# Patient Record
Sex: MALE | Race: BLACK OR AFRICAN AMERICAN
[De-identification: names, ages, dates, MRNs, and addresses within clinical notes are randomized per-mention and may not be internally consistent; named-entity substitution may affect disease eponyms.]

---
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
## 2018-02-09 ENCOUNTER — HOSPITAL ENCOUNTER (OUTPATIENT)
Dept: HOSPITAL 62 - END | Age: 18
Discharge: HOME | End: 2018-02-09
Attending: INTERNAL MEDICINE
Payer: OTHER GOVERNMENT

## 2018-02-09 VITALS — DIASTOLIC BLOOD PRESSURE: 74 MMHG | SYSTOLIC BLOOD PRESSURE: 122 MMHG

## 2018-02-09 DIAGNOSIS — K20.9: ICD-10-CM

## 2018-02-09 DIAGNOSIS — K31.84: Primary | ICD-10-CM

## 2018-02-09 PROCEDURE — 43236 UPPR GI SCOPE W/SUBMUC INJ: CPT

## 2018-02-09 PROCEDURE — 88305 TISSUE EXAM BY PATHOLOGIST: CPT

## 2018-02-09 PROCEDURE — 88342 IMHCHEM/IMCYTCHM 1ST ANTB: CPT

## 2018-02-09 PROCEDURE — 43239 EGD BIOPSY SINGLE/MULTIPLE: CPT

## 2018-02-09 PROCEDURE — 88312 SPECIAL STAINS GROUP 1: CPT

## 2018-02-09 PROCEDURE — 0DB58ZX EXCISION OF ESOPHAGUS, VIA NATURAL OR ARTIFICIAL OPENING ENDOSCOPIC, DIAGNOSTIC: ICD-10-PCS | Performed by: INTERNAL MEDICINE

## 2018-02-09 PROCEDURE — 3E0G8GC INTRODUCTION OF OTHER THERAPEUTIC SUBSTANCE INTO UPPER GI, VIA NATURAL OR ARTIFICIAL OPENING ENDOSCOPIC: ICD-10-PCS | Performed by: INTERNAL MEDICINE

## 2018-02-09 RX ADMIN — FENTANYL CITRATE ONE MCG: 50 INJECTION INTRAMUSCULAR; INTRAVENOUS at 10:42

## 2018-02-09 RX ADMIN — MIDAZOLAM HYDROCHLORIDE ONE MG: 1 INJECTION, SOLUTION INTRAMUSCULAR; INTRAVENOUS at 10:36

## 2018-02-09 RX ADMIN — FENTANYL CITRATE ONE MCG: 50 INJECTION INTRAMUSCULAR; INTRAVENOUS at 10:38

## 2018-02-09 RX ADMIN — MIDAZOLAM HYDROCHLORIDE ONE MG: 1 INJECTION, SOLUTION INTRAMUSCULAR; INTRAVENOUS at 10:40

## 2018-02-09 NOTE — OPERATIVE REPORT
Operative Report


DATE OF SURGERY: 02/09/18


Operative Report: 





The risks benefits and alternatives of the procedure explained to the patient 

in detail and informed consent is obtained.A GIF Olympus video scope was 

inserted into the patient's mouth and hypopharynx, the esophagus is identified 

intubated and insufflated, the scope was then advanced through the esophagus 

stomach and duodenum ,retroflexion maneuver is done ,the esophagus stomach and 

first and second portions of the duodenum examined


PREOPERATIVE DIAGNOSIS: Gastroparesis, cyclic vomiting syndrome, GERD


POSTOPERATIVE DIAGNOSIS: Erosive esophagitis status post biopsy.  Submucosal 

injection Botox gastric antrum


OPERATION: EGD with submucosal injection.  EGD with biopsy


SURGEON: ROSA BRANDT


ANESTHESIA: Moderate Sedation - 4 mg of Versed, 75 mcg of fentanyl.  Conscious 

sedation monitoring time 30 minutes.


TISSUE REMOVED OR ALTERED: Distal esophageal specimen obtained without Murillo'

s esophagus.


COMPLICATIONS: 





None.


ESTIMATED BLOOD LOSS: None.


INTRAOPERATIVE FINDINGS: As noted above.


PROCEDURE: 





Patient tolerated procedure well.


No immediate postprocedure complications are noted.


Patient discharged in good condition.


Discharge date 2/9/2018.


Discharge diet: Regular.


Discharge activity: Regular.


2-3 week follow-up to discuss findings.


Patient is instructed to call the office or proceed to the emergency room 

should there be any further problems or questions.


We will await pathology.


Continue proton pump inhibitor therapy.

## 2018-05-03 ENCOUNTER — HOSPITAL ENCOUNTER (OUTPATIENT)
Dept: HOSPITAL 62 - END | Age: 18
Discharge: HOME | End: 2018-05-03
Attending: INTERNAL MEDICINE
Payer: OTHER GOVERNMENT

## 2018-05-03 VITALS — DIASTOLIC BLOOD PRESSURE: 73 MMHG | SYSTOLIC BLOOD PRESSURE: 121 MMHG

## 2018-05-03 DIAGNOSIS — K21.0: ICD-10-CM

## 2018-05-03 DIAGNOSIS — K29.70: ICD-10-CM

## 2018-05-03 DIAGNOSIS — K31.84: Primary | ICD-10-CM

## 2018-05-03 DIAGNOSIS — K44.9: ICD-10-CM

## 2018-05-03 PROCEDURE — 88305 TISSUE EXAM BY PATHOLOGIST: CPT

## 2018-05-03 PROCEDURE — 43239 EGD BIOPSY SINGLE/MULTIPLE: CPT

## 2018-05-03 PROCEDURE — 43236 UPPR GI SCOPE W/SUBMUC INJ: CPT

## 2018-05-03 RX ADMIN — FENTANYL CITRATE ONE MCG: 50 INJECTION INTRAMUSCULAR; INTRAVENOUS at 13:52

## 2018-05-03 RX ADMIN — MIDAZOLAM HYDROCHLORIDE ONE MG: 1 INJECTION, SOLUTION INTRAMUSCULAR; INTRAVENOUS at 13:55

## 2018-05-03 RX ADMIN — FENTANYL CITRATE ONE MCG: 50 INJECTION INTRAMUSCULAR; INTRAVENOUS at 13:54

## 2018-05-03 RX ADMIN — MIDAZOLAM HYDROCHLORIDE ONE MG: 1 INJECTION, SOLUTION INTRAMUSCULAR; INTRAVENOUS at 13:51

## 2018-05-03 NOTE — OPERATIVE REPORT
Operative Report


DATE OF SURGERY: 05/03/18


Operative Report: 





The risks benefits and alternatives of the procedure explained to the patient 

in detail and informed consent is obtained.A GIF Olympus video scope was 

inserted into the patient's mouth and hypopharynx, the esophagus is identified 

intubated and insufflated, the scope was then advanced through the esophagus 

stomach and duodenum, retroflexion maneuver is done ,the esophagus stomach and 

first and second portions of the duodenum examined


PREOPERATIVE DIAGNOSIS: Nausea vomiting, gastroesophageal reflux disease


POSTOPERATIVE DIAGNOSIS: Esophageal rings and furrows status post biopsy rule 

out eosinophilic esophagitis.  Esophagitis.  Gastritis.  Hiatal hernia.  

Gastroparesis status post Botox injection


OPERATION: EGD with submucosal injection.  EGD with biopsy


SURGEON: ROSA BRANDT


ANESTHESIA: Moderate Sedation - 4 mg of Versed, 75 mcg of fentanyl.  Conscious 

sedation monitoring time 30 minutes.


TISSUE REMOVED OR ALTERED: Esophageal specimens obtained for reasons stated 

above


COMPLICATIONS: 





None.


ESTIMATED BLOOD LOSS: None.


INTRAOPERATIVE FINDINGS: As noted above.


PROCEDURE: 





Patient tolerated the procedure well.


No immediate postprocedure complications are noted.


Patient discharged in good condition.


Discharge date 5/3/2018.


Discharge diet: Regular.


Discharge activity: Regular.


2-3 week follow-up to discuss findings.


Patient is instructed call the office or proceed to the emergency room should 

there be any further problems or questions.


We will wait on pathology.

## 2018-08-23 ENCOUNTER — HOSPITAL ENCOUNTER (OUTPATIENT)
Dept: HOSPITAL 62 - END | Age: 18
Discharge: HOME | End: 2018-08-23
Attending: INTERNAL MEDICINE
Payer: OTHER GOVERNMENT

## 2018-08-23 VITALS — DIASTOLIC BLOOD PRESSURE: 76 MMHG | SYSTOLIC BLOOD PRESSURE: 124 MMHG

## 2018-08-23 DIAGNOSIS — Z79.899: ICD-10-CM

## 2018-08-23 DIAGNOSIS — G43.A0: Primary | ICD-10-CM

## 2018-08-23 DIAGNOSIS — K31.84: ICD-10-CM

## 2018-08-23 PROCEDURE — 43236 UPPR GI SCOPE W/SUBMUC INJ: CPT

## 2018-08-23 NOTE — OPERATIVE REPORT
Operative Report


DATE OF SURGERY: 08/23/18


Operative Report: 





The risks benefits and alternatives of the procedure explained to the patient 

in detail and informed consent is obtained.A GIF Olympus video scope was 

inserted into the patient's mouth and hypopharynx, the esophagus is identified 

intubated and insufflated, the scope was then advanced through the esophagus 

stomach and duodenum, retroflexion maneuver is done the esophagus stomach and 

first and second portions of the duodenum examined


PREOPERATIVE DIAGNOSIS: Gastroparesis, cyclic vomiting syndrome


POSTOPERATIVE DIAGNOSIS: Status post Botox injection of the gastric outlet 

total of 5 mL of 200 units


OPERATION: EGD with submucosal injection


SURGEON: ROSA BRANDT


ANESTHESIA: Moderate Sedation - 4 mg of Versed, 75 mcg of fentanyl.  Conscious 

sedation monitoring time 30 minutes.


TISSUE REMOVED OR ALTERED: None.


COMPLICATIONS: 





None.


ESTIMATED BLOOD LOSS: None.


INTRAOPERATIVE FINDINGS: As noted above.


PROCEDURE: 





Patient tolerated the procedure well.  No immediate postprocedure complications 

are noted.  Patient discharged in good condition.


Discharge date 8/23/2018.


Discharge diet: Regular.


Discharge activity: Regular.


2-3 week follow-up to discuss findings.


Patient is instructed call the office or proceed to the emergency room should 

there be any further problems or questions.


Patient to call if further injections are required.

## 2019-02-12 ENCOUNTER — HOSPITAL ENCOUNTER (OUTPATIENT)
Dept: HOSPITAL 62 - END | Age: 19
Discharge: HOME | End: 2019-02-12
Attending: INTERNAL MEDICINE
Payer: MEDICARE

## 2019-02-12 VITALS — DIASTOLIC BLOOD PRESSURE: 67 MMHG | SYSTOLIC BLOOD PRESSURE: 124 MMHG

## 2019-02-12 DIAGNOSIS — G40.309: ICD-10-CM

## 2019-02-12 DIAGNOSIS — K31.84: Primary | ICD-10-CM
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
## 2019-02-12 DIAGNOSIS — K44.9: ICD-10-CM

## 2019-02-12 DIAGNOSIS — E88.40: ICD-10-CM

## 2019-02-12 DIAGNOSIS — Z79.899: ICD-10-CM

## 2019-02-12 DIAGNOSIS — R11.2: ICD-10-CM

## 2019-02-12 DIAGNOSIS — T18.2XXA: ICD-10-CM

## 2019-02-12 PROCEDURE — 43236 UPPR GI SCOPE W/SUBMUC INJ: CPT

## 2019-02-12 RX ADMIN — MIDAZOLAM HYDROCHLORIDE ONE MG: 1 INJECTION, SOLUTION INTRAMUSCULAR; INTRAVENOUS at 12:00

## 2019-02-12 RX ADMIN — MIDAZOLAM HYDROCHLORIDE ONE MG: 1 INJECTION, SOLUTION INTRAMUSCULAR; INTRAVENOUS at 11:57

## 2019-07-09 ENCOUNTER — HOSPITAL ENCOUNTER (OUTPATIENT)
Dept: HOSPITAL 62 - END | Age: 19
Discharge: HOME | End: 2019-07-09
Attending: INTERNAL MEDICINE
Payer: MEDICARE

## 2019-07-09 VITALS — SYSTOLIC BLOOD PRESSURE: 111 MMHG | DIASTOLIC BLOOD PRESSURE: 30 MMHG

## 2019-07-09 DIAGNOSIS — K21.0: ICD-10-CM

## 2019-07-09 DIAGNOSIS — R11.2: ICD-10-CM

## 2019-07-09 DIAGNOSIS — Z79.899: ICD-10-CM

## 2019-07-09 DIAGNOSIS — K31.84: Primary | ICD-10-CM

## 2019-07-09 PROCEDURE — 43236 UPPR GI SCOPE W/SUBMUC INJ: CPT

## 2019-07-09 RX ADMIN — MIDAZOLAM HYDROCHLORIDE ONE MG: 1 INJECTION, SOLUTION INTRAMUSCULAR; INTRAVENOUS at 12:15

## 2019-07-09 RX ADMIN — MIDAZOLAM HYDROCHLORIDE ONE MG: 1 INJECTION, SOLUTION INTRAMUSCULAR; INTRAVENOUS at 12:12

## 2019-07-09 NOTE — OPERATIVE REPORT
Operative Report


DATE OF SURGERY: 07/09/19


Operative Report: 





The risks benefits and alternatives of the procedure explained to the patient in

detail and informed consent is obtained.A GIF Olympus video scope was inserted 

into the patient's mouth and hypopharynx, the esophagus is identified intubated 

and insufflated, the scope was then advanced through the esophagus stomach and 

duodenum ,retroflexion maneuver is done ,the esophagus stomach and first and 

second portions of the duodenum examined.


PREOPERATIVE DIAGNOSIS: Gastroparesis, nausea vomiting


POSTOPERATIVE DIAGNOSIS: Gastroparesis status post submucosal Botox injection


OPERATION: EGD with submucosal injection


SURGEON: ROSA BRANDT


ANESTHESIA: Moderate Sedation - 4 mg of Versed, 75 mcg of fentanyl.  Conscious 

sedation monitoring time 30 minutes.


TISSUE REMOVED OR ALTERED: None.


COMPLICATIONS: 





None.


ESTIMATED BLOOD LOSS: None.


INTRAOPERATIVE FINDINGS: As noted above.


PROCEDURE: 





Patient tolerated the procedure well.


No immediate postprocedure complications are noted.


Patient is discharged in good condition.


Discharge date 7/9/2019.


Discharge diet: Regular.


Discharge activity: Regular.


2 to 3-week follow-up to discuss findings.


PRN follow-up.

## 2020-02-04 ENCOUNTER — HOSPITAL ENCOUNTER (OUTPATIENT)
Dept: HOSPITAL 62 - END | Age: 20
Discharge: HOME | End: 2020-02-04
Attending: INTERNAL MEDICINE
Payer: MEDICARE

## 2020-02-04 VITALS — SYSTOLIC BLOOD PRESSURE: 123 MMHG | DIASTOLIC BLOOD PRESSURE: 59 MMHG

## 2020-02-04 DIAGNOSIS — K31.84: Primary | ICD-10-CM

## 2020-02-04 DIAGNOSIS — K21.9: ICD-10-CM

## 2020-02-04 DIAGNOSIS — G62.9: ICD-10-CM

## 2020-02-04 DIAGNOSIS — Z79.899: ICD-10-CM

## 2020-02-04 DIAGNOSIS — E88.40: ICD-10-CM

## 2020-02-04 DIAGNOSIS — G40.A09: ICD-10-CM

## 2020-02-04 DIAGNOSIS — R11.2: ICD-10-CM

## 2020-02-04 PROCEDURE — 43236 UPPR GI SCOPE W/SUBMUC INJ: CPT

## 2020-02-04 RX ADMIN — MIDAZOLAM HYDROCHLORIDE ONE MG: 1 INJECTION, SOLUTION INTRAMUSCULAR; INTRAVENOUS at 12:50

## 2020-02-04 RX ADMIN — MIDAZOLAM HYDROCHLORIDE ONE MG: 1 INJECTION, SOLUTION INTRAMUSCULAR; INTRAVENOUS at 12:46

## 2020-02-04 NOTE — OPERATIVE REPORT
Operative Report


DATE OF SURGERY: 02/04/20


Operative Report: 





The risks benefits and alternatives of the procedure explained to the patient in

detail and informed consent is obtained.A  GIF Olympus video scope was inserted 

into the patient's mouth and hypopharynx, the esophagus is identified intubated 

and insufflated ,the scope was then advanced through the esophagus stomach and 

duodenum, retroflexion maneuver is done ,the esophagus stomach and first and 

second portions of the duodenum examined


PREOPERATIVE DIAGNOSIS: Nausea vomiting, gastroparesis


POSTOPERATIVE DIAGNOSIS: Gastroparesis status post Botox injection 100 units per

4 mL


OPERATION: EGD with submucosal injection


SURGEON: ROSA BRANDT


ANESTHESIA: LMAC


TISSUE REMOVED OR ALTERED: As noted above.


COMPLICATIONS: 





None.


ESTIMATED BLOOD LOSS: None.


INTRAOPERATIVE FINDINGS: As noted above.


PROCEDURE: 





Patient tolerated the procedure well.


No immediate postprocedure complications are noted.


Patient is discharged in good condition.  Discharge date 2/4/2020.





Discharge diet: Regular.


Discharge activity: Regular.


2 to 3-week follow-up to discuss findings.


Patient is instructed to call the office or proceed to the emergency room should

there be any further problems or questions.

## 2020-06-18 ENCOUNTER — HOSPITAL ENCOUNTER (OUTPATIENT)
Dept: HOSPITAL 62 - OROUT | Age: 20
Discharge: HOME | End: 2020-06-18
Attending: INTERNAL MEDICINE
Payer: MEDICARE

## 2020-06-18 VITALS — SYSTOLIC BLOOD PRESSURE: 128 MMHG | DIASTOLIC BLOOD PRESSURE: 68 MMHG

## 2020-06-18 DIAGNOSIS — K31.84: Primary | ICD-10-CM

## 2020-06-18 DIAGNOSIS — K21.9: ICD-10-CM

## 2020-06-18 DIAGNOSIS — Z03.818: ICD-10-CM

## 2020-06-18 DIAGNOSIS — Z79.899: ICD-10-CM

## 2020-06-18 PROCEDURE — 87635 SARS-COV-2 COVID-19 AMP PRB: CPT

## 2020-06-18 PROCEDURE — 43236 UPPR GI SCOPE W/SUBMUC INJ: CPT

## 2020-06-18 PROCEDURE — C9803 HOPD COVID-19 SPEC COLLECT: HCPCS

## 2020-06-18 PROCEDURE — 00731 ANES UPR GI NDSC PX NOS: CPT

## 2020-06-18 NOTE — OPERATIVE REPORT
Operative Report


DATE OF SURGERY: 06/18/20


Operative Report: 





The risks benefits and alternatives of the procedure explained to the patient in

detail and informed consent is obtained.A GIF Olympus video scope was inserted 

into the patient's mouth and hypopharynx, the esophagus is identified intubated 

and insufflated ,the scope was then advanced through the esophagus stomach and 

duodenum, retroflexion maneuver is done the esophagus stomach and first and 

second portions of the duodenum examined


PREOPERATIVE DIAGNOSIS: Nausea vomiting, gastroparesis


POSTOPERATIVE DIAGNOSIS: Botox injection 100 units per 4 mL with 1 mL per 

quadrant injection at the gastric outlet.  Improved gastritis


OPERATION: EGD with submucosal injection


SURGEON: ROSA BRANDT


ANESTHESIA: LMAC


TISSUE REMOVED OR ALTERED: As noted above.


COMPLICATIONS: 





None.


ESTIMATED BLOOD LOSS: None.


INTRAOPERATIVE FINDINGS: As noted above.


PROCEDURE: 





Patient tolerated the procedure well.


No immediate postprocedure complications are noted.


Patient is discharged in good condition.  Discharge date


6/18/2020.


Discharge diet: Regular.


Discharge activity: Regular.


2 to 3-week follow-up to discuss findings.


Patient is instructed call the office or proceed to the emergency room should 

there be any further problems questions.

## 2020-10-14 ENCOUNTER — HOSPITAL ENCOUNTER (EMERGENCY)
Dept: HOSPITAL 62 - ER | Age: 20
LOS: 1 days | Discharge: HOME | End: 2020-10-15
Payer: MEDICARE

## 2020-10-14 DIAGNOSIS — R68.83: ICD-10-CM

## 2020-10-14 DIAGNOSIS — E88.40: ICD-10-CM

## 2020-10-14 DIAGNOSIS — R10.13: ICD-10-CM

## 2020-10-14 DIAGNOSIS — R11.2: Primary | ICD-10-CM

## 2020-10-14 LAB
ADD MANUAL DIFF: NO
ALBUMIN SERPL-MCNC: 4.5 G/DL (ref 3.5–5)
ALP SERPL-CCNC: 56 U/L (ref 38–126)
ANION GAP SERPL CALC-SCNC: 14 MMOL/L (ref 5–19)
APPEARANCE UR: CLEAR
APTT PPP: YELLOW S
AST SERPL-CCNC: 24 U/L (ref 17–59)
BASE EXCESS BLDV CALC-SCNC: -1.3 MMOL/L
BASOPHILS # BLD AUTO: 0 10^3/UL (ref 0–0.2)
BASOPHILS NFR BLD AUTO: 0.2 % (ref 0–2)
BILIRUB DIRECT SERPL-MCNC: 0.3 MG/DL (ref 0–0.4)
BILIRUB SERPL-MCNC: 0.7 MG/DL (ref 0.2–1.3)
BILIRUB UR QL STRIP: NEGATIVE
BUN SERPL-MCNC: 20 MG/DL (ref 7–20)
CALCIUM: 9.1 MG/DL (ref 8.4–10.2)
CHLORIDE SERPL-SCNC: 95 MMOL/L (ref 98–107)
CO2 SERPL-SCNC: 25 MMOL/L (ref 22–30)
EOSINOPHIL # BLD AUTO: 0 10^3/UL (ref 0–0.6)
EOSINOPHIL NFR BLD AUTO: 0 % (ref 0–6)
ERYTHROCYTE [DISTWIDTH] IN BLOOD BY AUTOMATED COUNT: 14 % (ref 11.5–14)
GLUCOSE SERPL-MCNC: 241 MG/DL (ref 75–110)
GLUCOSE UR STRIP-MCNC: >=500 MG/DL
HCO3 BLDV-SCNC: 21 MMOL/L (ref 20–32)
HCT VFR BLD CALC: 41.8 % (ref 37.9–51)
HGB BLD-MCNC: 13.8 G/DL (ref 13.5–17)
KETONES UR STRIP-MCNC: 20 MG/DL
LYMPHOCYTES # BLD AUTO: 1 10^3/UL (ref 0.5–4.7)
LYMPHOCYTES NFR BLD AUTO: 7.6 % (ref 13–45)
MCH RBC QN AUTO: 26.2 PG (ref 27–33.4)
MCHC RBC AUTO-ENTMCNC: 33 G/DL (ref 32–36)
MCV RBC AUTO: 79 FL (ref 80–97)
MONOCYTES # BLD AUTO: 0.7 10^3/UL (ref 0.1–1.4)
MONOCYTES NFR BLD AUTO: 5.5 % (ref 3–13)
NEUTROPHILS # BLD AUTO: 11.5 10^3/UL (ref 1.7–8.2)
NEUTS SEG NFR BLD AUTO: 86.7 % (ref 42–78)
NITRITE UR QL STRIP: NEGATIVE
PCO2 BLDV: 28.7 MMHG (ref 35–63)
PH BLDV: 7.48 [PH] (ref 7.3–7.42)
PH UR STRIP: 6 [PH] (ref 5–9)
PLATELET # BLD: 225 10^3/UL (ref 150–450)
POTASSIUM SERPL-SCNC: 3.5 MMOL/L (ref 3.6–5)
PROT SERPL-MCNC: 7.2 G/DL (ref 6.3–8.2)
PROT UR STRIP-MCNC: 30 MG/DL
RBC # BLD AUTO: 5.27 10^6/UL (ref 4.35–5.55)
SP GR UR STRIP: 1.03
TOTAL CELLS COUNTED % (AUTO): 100 %
UROBILINOGEN UR-MCNC: NEGATIVE MG/DL (ref ?–2)
WBC # BLD AUTO: 13.2 10^3/UL (ref 4–10.5)

## 2020-10-14 PROCEDURE — C9113 INJ PANTOPRAZOLE SODIUM, VIA: HCPCS

## 2020-10-14 PROCEDURE — 96372 THER/PROPH/DIAG INJ SC/IM: CPT

## 2020-10-14 PROCEDURE — 80053 COMPREHEN METABOLIC PANEL: CPT

## 2020-10-14 PROCEDURE — 82803 BLOOD GASES ANY COMBINATION: CPT

## 2020-10-14 PROCEDURE — 82962 GLUCOSE BLOOD TEST: CPT

## 2020-10-14 PROCEDURE — 82271 OCCULT BLOOD OTHER SOURCES: CPT

## 2020-10-14 PROCEDURE — 85025 COMPLETE CBC W/AUTO DIFF WBC: CPT

## 2020-10-14 PROCEDURE — 96361 HYDRATE IV INFUSION ADD-ON: CPT

## 2020-10-14 PROCEDURE — 99285 EMERGENCY DEPT VISIT HI MDM: CPT

## 2020-10-14 PROCEDURE — 96375 TX/PRO/DX INJ NEW DRUG ADDON: CPT

## 2020-10-14 PROCEDURE — 36415 COLL VENOUS BLD VENIPUNCTURE: CPT

## 2020-10-14 PROCEDURE — 81001 URINALYSIS AUTO W/SCOPE: CPT

## 2020-10-14 PROCEDURE — 74022 RADEX COMPL AQT ABD SERIES: CPT

## 2020-10-14 PROCEDURE — 96366 THER/PROPH/DIAG IV INF ADDON: CPT

## 2020-10-14 PROCEDURE — 83690 ASSAY OF LIPASE: CPT

## 2020-10-14 PROCEDURE — 96365 THER/PROPH/DIAG IV INF INIT: CPT

## 2020-10-14 NOTE — ER DOCUMENT REPORT
ED GI/





<BLAYNE DUKES - Last Filed: 10/15/20 01:24>





- General


Mode of Arrival: Wheelchair


Information source: Patient, Parent


TRAVEL OUTSIDE OF THE [place] IN LAST 30 DAYS: No





- HPI


Patient complains to provider of: Abdominal pain, Vomiting


Onset: Last week


Timing/Duration: Persistent


Quality of pain: Sharp


Severity at maximum: Moderate


Severity in ED: Moderate


Location: Epigastric


Relieved by: Denies


Similar symptoms previously: Yes





- Related Data


Home Medications: zofran ODT





<SANTO DASILVA - Last Filed: 10/15/20 08:05>





- General


Chief Complaint: Vomiting


Stated Complaint: VOMITING


Time Seen by Provider: 10/14/20 16:29


Primary Care Provider: 


ROSA GUAN MD [ACTIVE STAFF] - Follow up as needed


SHELBY HOLGUIN MD [Primary Care Provider] - Follow up as needed


Notes: 





This is a 19-year-old male here today for vomiting and epigastric pain that 

began early Tuesday morning.  Patient has a history of mitochondrial disease and

cyclic vomiting and is followed by Dr. Guan.  Last episode of this was in 2014.

 He is vomiting bile and chunky dried blood his last meal was early Tuesday 

morning epigastric pain is sharp and is a 5 out of 10.  He has not been able to 

keep down Zofran.  And states he cannot remember when his last bowel movement 

was.  He also has chills which is common with these episodes. (SANTO DASILVA)





- Related Data


Allergies/Adverse Reactions: 


                                        





No Known Allergies Allergy (Verified 10/14/20 16:32)


   











Past Medical History





- General


Information source: Patient, Parent





- Social History


Smoking Status: Never Smoker


Chew tobacco use (# tins/day): No


Frequency of alcohol use: None


Drug Abuse: None


Family History: Reviewed & Not Pertinent


Patient has suicidal ideation: No


Patient has homicidal ideation: No





- Past Medical History


Cardiac Medical History: Reports: Hx Heart Murmur - ASD, Mitral regurgitation


   Denies: Hx Coronary Artery Disease, Hx Heart Attack, Hx Hypertension


Pulmonary Medical History: 


   Denies: Hx Asthma, Hx Bronchitis, Hx COPD, Hx Pneumonia


EENT Medical History: Reports: Eyes


Neurological Medical History: Reports: Other - mitochondrial disease.  Denies: 

Hx Cerebrovascular Accident, Hx Seizures


Endocrine Medical History: Reports: None


Renal/ Medical History: Reports: None


Malignancy Medical History: Reports None


GI Medical History: Reports: Other - gastroparesis


Musculoskeletal Medical History: Denies Hx Arthritis, Reports Other - 

mitochondrial disease


Skin Medical History: Reports None


Psychiatric Medical History: Reports: None


Traumatic Medical History: Reports: None


Infectious Medical History: Reports: None


Past Surgical History: Reports: None





- Immunizations


Hx Diphtheria, Pertussis, Tetanus Vaccination: No





<BROWNSANTO PAUL - Last Filed: 10/15/20 08:05>





Review of Systems





- Review of Systems


Constitutional: Chills.  denies: Fever


EENT: No symptoms reported.  denies: Throat pain


Cardiovascular: No symptoms reported.  denies: Chest pain, Palpitations, 

Dizziness, Lightheaded


Respiratory: No symptoms reported.  denies: Cough, Short of breath


Gastrointestinal: Abdominal pain, Nausea, Vomiting, Constipation, Blood in vomi

t, Last bowel movement - unknown


Genitourinary: No symptoms reported.  denies: Dysuria


Musculoskeletal: No symptoms reported


Skin: No symptoms reported


Hematologic/Lymphatic: No symptoms reported


Neurological/Psychological: No symptoms reported.  denies: Gait changes, Headac

hes, Numbness, Tingling





<SANTO DASILVA - Last Filed: 10/15/20 08:05>





Physical Exam





- General


General appearance: Appears well


In distress: Mild





- HEENT


Head: Normocephalic


Eyes: Normal


Conjunctiva: Normal


Nasal: Normal


Mouth/Lips: Normal





- Respiratory


Respiratory status: No respiratory distress


Breath sounds: Normal





- Cardiovascular


Rhythm: Regular


Heart sounds: Normal auscultation


Normal capillary refill: Yes





- Abdominal


Distension: No distension


Bowel sounds: Normal


Tenderness: Tender


Organomegaly: No organomegaly





- Extremities


General upper extremity: Normal inspection, Normal ROM


General lower extremity: Normal inspection, Normal ROM





- Neurological


Cognition: Normal


Orientation: AAOx4


Speech: Normal





- Psychological


Associated symptoms: Normal affect, Normal mood





- Skin


Skin Temperature: Warm


Skin Moisture: Dry


Skin Color: Normal





<SANTO DASILVA - Last Filed: 10/15/20 08:05>





- Vital signs


Vitals: 


                                        











Temp Pulse Resp BP Pulse Ox


 


 98.1 F   93   20   141/84 H  100 


 


 10/14/20 15:15  10/14/20 15:15  10/14/20 15:15  10/14/20 15:15  10/14/20 15:15














Course





- Laboratory


Result Diagrams: 


                                 10/14/20 18:46





                                 10/14/20 18:46





<BLAYNE DUKES - Last Filed: 10/15/20 01:24>





- Laboratory


Result Diagrams: 


                                 10/14/20 18:46





                                 10/14/20 18:46





- Diagnostic Test


Radiology reviewed: Image reviewed, Reports reviewed





<SANTO DASILVA - Last Filed: 10/15/20 08:05>





- Re-evaluation


Re-evalutation: 





10/14/20 23:40


Patient has been reevaluated several times.  He did have a very large bowel mov

ement after the enema was administered.  Patient reports he feels better.  

Patient wants to go home.  Mother is concerned that patient will "stick his 

fingers back down his throat to vomit".  We will give a dose of IM Phenergan at 

this time and reevaluate again after the Phenergan has time to work.  As long as

his Accu-Chek is okay we will then discharge him home with a take-home pack of 

Phenergan suppositories.  They will follow-up with Dr. Guan, mother will call 

in the morning for an appointment.





 (BLAYNE DUKES)





10/14/20 18:37


Consulted with Dr. Cheung regarding patient presentation and diagnostic 

evaluation.  He does recommend obtaining a Accu-Chek and agrees with the plan 

for IV fluid administration of D10.  Recommends obtaining an acute abdominal 

series to evaluate for any possible free air given patient's repeated vomiting 

and Gastroccult positive emesis.


10/14/20 19:49


Patient continues with small amounts of emesis at bedside.  No obvious blood no

nimco to emesis.  Patient denies any abdominal pain symptoms.  Consulted with Dr. Cheung regarding patient's management at this time.  Does recommend giving 

patient enema as well as a additional IV fluid bolus of normal saline at this 

time as patient's blood sugar is over 240.  Dr. Cheung to bedside to discuss 

plan of care with patient and family.


10/14/20 20:24


Bedside report and handoff given to Blayne Dukes, JEM (SANTO DASILVA)





- Vital Signs


Vital signs: 


                                        











Temp Pulse Resp BP Pulse Ox


 


 99.6 F   93   19   147/78 H  100 


 


 10/15/20 02:00  10/14/20 15:15  10/15/20 02:00  10/15/20 02:00  10/15/20 02:00














- Laboratory


Laboratory results interpreted by me: 


                                        











  10/14/20 10/14/20 10/14/20





  18:46 18:46 19:40


 


WBC  13.2 H  


 


MCV  79 L  


 


MCH  26.2 L  


 


Lymph % (Auto)  7.6 L  


 


Absolute Neuts (auto)  11.5 H  


 


Seg Neutrophils %  86.7 H  


 


VBG pH   


 


VBG pCO2   


 


Sodium   134.0 L 


 


Potassium   3.5 L 


 


Chloride   95 L 


 


Glucose   241 H 


 


Lipase   21.5 L 


 


Urine Protein    30 H


 


Urine Glucose (UA)    >=500 H


 


Urine Ketones    20 H














  10/14/20





  20:05


 


WBC 


 


MCV 


 


MCH 


 


Lymph % (Auto) 


 


Absolute Neuts (auto) 


 


Seg Neutrophils % 


 


VBG pH  7.48 H


 


VBG pCO2  28.7 L


 


Sodium 


 


Potassium 


 


Chloride 


 


Glucose 


 


Lipase 


 


Urine Protein 


 


Urine Glucose (UA) 


 


Urine Ketones 














Discharge





<BLAYNE DUKES - Last Filed: 10/15/20 01:24>





<SANTO DASILVA - Last Filed: 10/15/20 08:05>





- Discharge


Clinical Impression: 


Nausea & vomiting


Qualifiers:


 Vomiting type: unspecified Vomiting Intractability: intractable Qualified 

Code(s): R11.2 - Nausea with vomiting, unspecified





Condition: Stable


Disposition: HOME, SELF-CARE


Additional Instructions: 


As discussed please call your gastroenterologist tomorrow to schedule an 

appointment.  Let him know you are seen in the emergency department.  Continue 

to use the Zofran that you have at home as prescribed.  Use the Phenergan 

suppositories if you are unable to keep down the Zofran.  Return to the 

emergency department with any new or worsening concerns.





Referrals: 


SHELBY HOLGUIN MD [Primary Care Provider] - Follow up as needed


ROSA GUAN MD [ACTIVE STAFF] - Follow up as needed

## 2020-10-14 NOTE — ER DOCUMENT REPORT
Doctor's Note


Notes: 





10/14/20 20:35


I was asked to evaluate this patient with midlevel provider.





This is a 19-year-old male with a history of mitochondrial disease previously 

followed at [facility].  He was diagnosed at [facility] at age 1 with a positive muscle biopsy.  He basically lives at home 

with his mother and is had long-term problems with chronic recurrent cyclic 

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  He has been seen by local

gastroenterologist Dr. Tinoco who has been treating him with intermittent Botox 

injections.  Patient stopped taking these injections about 4 months ago because 

of some perceived side effects.  He is gotten along reasonably well up until 

this week.  He has been constipated and has had recurrent episodes of vomiting. 

Mother feels he is getting listless and dehydrated and also noted that he had a 

small amount of coffee ground emesis this afternoon.  She brought him here for 

evaluation.





I reviewed the complete chart including labs and imaging for this patient and I 

have examined him at the bedside and spoken with his mother as well.





At this point he has an elevated glucose about 275.  His CO2 is normal.  I think

this is probably stress hyperglycemia.  His abdomen is mildly distended and he 

has some mild tenderness in the left lower quadrant.  Plain films show that he 

has a large amount of stool present.  He is afebrile.  White count is 13,000.  

His hemoglobin is normal.  His liver function tests normal.  His creatinine is 

normal.His urine is remarkable for glycosuria and otherwise notable only for 

specific gravity consistent with some dehydration.





Current recommendation is continue administration of antiemetic, IV hydration, 

follow Accu-Chek and administer enemas.





He should be reexamined thereafter.  He improved with these interventions 

probably be managed as an outpatient.  If not next consideration would be to 

obtain a CT scan.


10/14/20 20:39

## 2020-10-14 NOTE — ER DOCUMENT REPORT
ED Medical Screen (RME)





- General


Stated Complaint: VOMITING


Time Seen by Provider: 10/14/20 16:29


Primary Care Provider: 


SHELBY HOLGUIN MD [Primary Care Provider] - Follow up as needed


Mode of Arrival: Wheelchair


Information source: Patient, Parent


Notes: 





HPI; 19-year-old male presents to the emergency room with his mom complaining of

cyclic vomiting since Tuesday.  Has been using ODT Zofran without relief.


Patient was born with cyclic vomiting, also has mitochondrial disease.  States 

that they spoke with his gastroenterologist Dr. Guan who recommended they come 

to the ER for labs IV fluids, and antiemetics.





PE:


Alert and oriented x3.  Lungs: Clear to auscultation without rales, rhonchi, 

wheezes.  Heart: Regular rate rhythm without murmurs, rubs, gallops.





I have greeted and performed a rapid initial assessment of this patient.  A 

comprehensive ED assessment and evaluation of the patient, analysis of test 

results and completion of the medical decision making process will be conducted 

by additional ED providers.  I have specifically instructed the patient or 

family members with the patient to immediately return to any nursing staff 

should anything change in the patient's condition or with their chief complaint.


TRAVEL OUTSIDE OF THE [place] IN LAST 30 DAYS: No





- Related Data


Allergies/Adverse Reactions: 


                                        





No Known Allergies Allergy (Verified 06/18/20 06:39)


   











Past Medical History





- Past Medical History


Cardiac Medical History: 


   Denies: Hx Coronary Artery Disease, Hx Heart Attack, Hx Hypertension


Pulmonary Medical History: 


   Denies: Hx Asthma, Hx Bronchitis, Hx COPD, Hx Pneumonia


Neurological Medical History: Denies: Hx Cerebrovascular Accident, Hx Seizures


Musculoskeltal Medical History: Denies Hx Arthritis





- Immunizations


Hx Diphtheria, Pertussis, Tetanus Vaccination: No





Physical Exam





- Vital signs


Vitals: 





                                        











Temp Pulse Resp BP Pulse Ox


 


 98.1 F   93   20   141/84 H  100 


 


 10/14/20 15:15  10/14/20 15:15  10/14/20 15:15  10/14/20 15:15  10/14/20 15:15














Course





- Vital Signs


Vital signs: 





                                        











Temp Pulse Resp BP Pulse Ox


 


 98.1 F   93   20   141/84 H  100 


 


 10/14/20 15:15  10/14/20 15:15  10/14/20 15:15  10/14/20 15:15  10/14/20 15:15














Doctor's Discharge





- Discharge


Referrals: 


SHELBY HOLGUIN MD [Primary Care Provider] - Follow up as needed

## 2020-10-14 NOTE — RADIOLOGY REPORT (SQ)
EXAM DESCRIPTION:  ACUTE ABDOMEN SERIES



IMAGES COMPLETED DATE/TIME:  10/14/2020 7:03 pm



REASON FOR STUDY:  vomiting, constipation, +gastro occult



COMPARISON:  None.



NUMBER OF VIEWS:  Three views.



TECHNIQUE:  Frontal chest, supine abdomen and upright/decubitus abdomen radiographic images acquired.




LIMITATIONS:  None.



FINDINGS:  CHEST: Lungs clear of infiltrates.

FREE AIR: None. No abnormal gas collections.

BOWEL GAS PATTERN: Nonobstructive pattern. No dilated loops or air fluid levels.

CONSTIPATION: marked.

CALCIFICATIONS: No suspicious calcifications.

HARDWARE: None in the abdomen.

SOFT TISSUES: No gross mass or suggestion of organomegaly.

BONES: No acute fracture. No worrisome bone lesions.

OTHER: No other significant finding.



IMPRESSION:  NO RADIOGRAPHIC EVIDENCE FOR ACUTE ABDOMINAL DISEASE.  CONSTIPATION.



TECHNICAL DOCUMENTATION:  JOB ID:  [id number]

[id number]

 2011 [employer]- All Rights Reserved



Reading location - IP/workstation name: [employer]

## 2020-10-15 VITALS — SYSTOLIC BLOOD PRESSURE: 147 MMHG | DIASTOLIC BLOOD PRESSURE: 78 MMHG

## 2020-10-22 ENCOUNTER — HOSPITAL ENCOUNTER (OUTPATIENT)
Dept: HOSPITAL 62 - END | Age: 20
Discharge: HOME | End: 2020-10-22
Attending: INTERNAL MEDICINE
Payer: MEDICARE

## 2020-10-22 VITALS — SYSTOLIC BLOOD PRESSURE: 138 MMHG | DIASTOLIC BLOOD PRESSURE: 89 MMHG

## 2020-10-22 DIAGNOSIS — K31.84: Primary | ICD-10-CM

## 2020-10-22 DIAGNOSIS — Z20.828: ICD-10-CM

## 2020-10-22 DIAGNOSIS — Z79.899: ICD-10-CM

## 2020-10-22 DIAGNOSIS — Q21.1: ICD-10-CM

## 2020-10-22 DIAGNOSIS — K21.9: ICD-10-CM

## 2020-10-22 DIAGNOSIS — K29.50: ICD-10-CM

## 2020-10-22 PROCEDURE — 43239 EGD BIOPSY SINGLE/MULTIPLE: CPT

## 2020-10-22 PROCEDURE — 88305 TISSUE EXAM BY PATHOLOGIST: CPT

## 2020-10-22 PROCEDURE — 87635 SARS-COV-2 COVID-19 AMP PRB: CPT

## 2020-10-22 PROCEDURE — C9803 HOPD COVID-19 SPEC COLLECT: HCPCS

## 2020-10-22 PROCEDURE — 00731 ANES UPR GI NDSC PX NOS: CPT

## 2020-10-22 PROCEDURE — 43236 UPPR GI SCOPE W/SUBMUC INJ: CPT

## 2020-10-22 NOTE — OPERATIVE REPORT
Operative Report


DATE OF SURGERY: 10/22/20


Operative Report: 





The risks benefits and alternatives of the procedure explained to the patient in

detail and informed consent is obtained.A  GIF Olympus video scope was inserted 

into the patient's mouth and hypopharynx, the esophagus is identified intubated 

and insufflated, the scope was then advanced through the esophagus stomach and 

duodenum, retroflexion maneuver is done, the esophagus stomach and first and 

second portions of the duodenum examined


PREOPERATIVE DIAGNOSIS: Nausea vomiting, gastroparesis


POSTOPERATIVE DIAGNOSIS: Gastritis status post biopsy.  Botox injection 100 

units in 4 mL  for a 25 units/mL injection at the gastric outlet


OPERATION: EGD with biopsy.  EGD with submucosal injection


SURGEON: ROSA BRANDT


ANESTHESIA: LMAC


TISSUE REMOVED OR ALTERED: As noted above.


COMPLICATIONS: 





None.


ESTIMATED BLOOD LOSS: None.


INTRAOPERATIVE FINDINGS: As noted above.


PROCEDURE: 





Patient tolerated the procedure well.


No immediate postprocedure complications are noted.


Patient is discharged in good condition.


Discharge date 10/22/2020.


Discharge diet: Regular.


Discharge activity: Regular.


2 to 3-week follow-up to discuss findings.


Patient is instructed to call the office or proceed to the emergency room should

there be any further problems or questions.


Wait on the pathology.